# Patient Record
Sex: MALE | Race: WHITE | Employment: FULL TIME | URBAN - METROPOLITAN AREA
[De-identification: names, ages, dates, MRNs, and addresses within clinical notes are randomized per-mention and may not be internally consistent; named-entity substitution may affect disease eponyms.]

---

## 2018-10-16 ENCOUNTER — HOSPITAL ENCOUNTER (EMERGENCY)
Age: 38
Discharge: HOME | End: 2018-10-16
Attending: EMERGENCY MEDICINE
Payer: COMMERCIAL

## 2018-10-16 VITALS
HEART RATE: 64 BPM | BODY MASS INDEX: 36.53 KG/M2 | TEMPERATURE: 98.1 F | SYSTOLIC BLOOD PRESSURE: 138 MMHG | OXYGEN SATURATION: 100 % | WEIGHT: 300 LBS | DIASTOLIC BLOOD PRESSURE: 83 MMHG | HEIGHT: 76 IN | RESPIRATION RATE: 18 BRPM

## 2018-10-16 DIAGNOSIS — K08.89 PAIN, DENTAL: Primary | ICD-10-CM

## 2018-10-16 PROCEDURE — 99282 EMERGENCY DEPT VISIT SF MDM: CPT

## 2018-10-16 RX ORDER — PENICILLIN V POTASSIUM 500 MG/1
500 TABLET ORAL 4 TIMES DAILY
Qty: 28 TABLET | Refills: 0 | Status: SHIPPED | OUTPATIENT
Start: 2018-10-16 | End: 2018-10-23

## 2018-10-16 RX ORDER — IBUPROFEN 800 MG/1
800 TABLET ORAL EVERY 8 HOURS PRN
Qty: 20 TABLET | Refills: 0 | Status: SHIPPED | OUTPATIENT
Start: 2018-10-16

## 2018-10-16 ASSESSMENT — ENCOUNTER SYMPTOMS
VOMITING: 0
WHEEZING: 0
NAUSEA: 0
DIARRHEA: 0
SHORTNESS OF BREATH: 0
ABDOMINAL PAIN: 0
COUGH: 0
EYE PAIN: 0

## 2018-10-16 ASSESSMENT — PAIN DESCRIPTION - PAIN TYPE: TYPE: ACUTE PAIN

## 2018-10-16 ASSESSMENT — PAIN SCALES - GENERAL: PAINLEVEL_OUTOF10: 10

## 2018-10-16 ASSESSMENT — PAIN DESCRIPTION - LOCATION: LOCATION: TEETH

## 2018-10-16 ASSESSMENT — PAIN DESCRIPTION - DESCRIPTORS: DESCRIPTORS: SHARP

## 2018-10-16 ASSESSMENT — PAIN DESCRIPTION - FREQUENCY: FREQUENCY: CONTINUOUS

## 2018-10-16 NOTE — ED PROVIDER NOTES
no evidence of abscess on the gum line or evidence of deep space infection of the head or neck. Will treat the patient with penicillin and nonsteroidal anti-inflammatory pain medications. We provided a dental referral list.  Counseled on the importance of close follow-up. Discharged home in good condition. Clinical Impression     1.  Pain, dental        Disposition     PATIENT REFERRED TO:  [facility] Emergency Department  [address] [place] [address]  [phone]    If symptoms worsen      DISCHARGE MEDICATIONS:  Discharge Medication List as of 10/16/2018  2:45 AM      START taking these medications    Details   penicillin v potassium (VEETID) 500 MG tablet Take 1 tablet by mouth 4 times daily for 7 days, Disp-28 tablet, R-0Print             DISPOSITION Decision To Discharge 10/16/2018 02:16:13 AM       Kam Catherine MD  10/16/18 [id number]

## 2021-08-31 ENCOUNTER — HOSPITAL ENCOUNTER (EMERGENCY)
Age: 41
Discharge: HOME | End: 2021-08-31
Attending: EMERGENCY MEDICINE

## 2021-08-31 VITALS
DIASTOLIC BLOOD PRESSURE: 74 MMHG | OXYGEN SATURATION: 100 % | HEART RATE: 74 BPM | TEMPERATURE: 98.8 F | SYSTOLIC BLOOD PRESSURE: 130 MMHG | RESPIRATION RATE: 16 BRPM

## 2021-08-31 DIAGNOSIS — H00.015 HORDEOLUM EXTERNUM OF LEFT LOWER EYELID: Primary | ICD-10-CM

## 2021-08-31 PROCEDURE — 99283 EMERGENCY DEPT VISIT LOW MDM: CPT

## 2021-08-31 RX ORDER — BACITRACIN 500 [USP'U]/G
OINTMENT OPHTHALMIC 3 TIMES DAILY
Qty: 1 TUBE | Refills: 0 | Status: SHIPPED | OUTPATIENT
Start: 2021-08-31 | End: 2021-09-10

## 2021-08-31 ASSESSMENT — PAIN DESCRIPTION - LOCATION: LOCATION: EYE

## 2021-08-31 ASSESSMENT — PAIN SCALES - GENERAL
PAINLEVEL_OUTOF10: 0
PAINLEVEL_OUTOF10: 8

## 2021-08-31 ASSESSMENT — PAIN DESCRIPTION - PAIN TYPE: TYPE: ACUTE PAIN

## 2021-08-31 ASSESSMENT — PAIN DESCRIPTION - ORIENTATION: ORIENTATION: LEFT

## 2021-08-31 NOTE — ED PROVIDER NOTES
[address] [facility]          ATTENDING PHYSICIAN NOTE       Date of evaluation: 8/31/2021    Chief Complaint     Eye Problem      History of Present Illness     Tali Albarado is a 35 y.o. male who presents today with left lower eyelid swelling. Has been present for 2 weeks states he actually had on the right side as well and that went away on its own. Has been trying compresses but comes in today stating that feels like it is a little bit worse. Appears to have a hordeolum left eye but denies vision changes no eye pain no significant drainage. Review of Systems     Review of Systems  A full 10 point review of systems was obtained. Pertinent positives and negatives as documented in the HPI, otherwise all other systems were reviewed and were negative. Past Medical, Surgical, Family, and Social History     He has a past medical history of Inguinal hernia and Seizures ([facility] [address] He has a past surgical history that includes hernia repair. His family history is not on file. He reports that he has been smoking cigarettes. He has been smoking about 1.00 pack per day. His smokeless tobacco use includes chew. He reports current alcohol use. He reports that he does not use drugs. Medications     Previous Medications    IBUPROFEN (ADVIL;MOTRIN) 800 MG TABLET    Take 1 tablet by mouth every 8 hours as needed for Pain       Allergies     He has No Known Allergies. Physical Exam     INITIAL VITALS: BP: (!) 140/86, Temp: 98.8 °F (37.1 °C), Pulse: 72, Resp: 17, SpO2: 95 %   Physical Exam  General: Well appearing in NAD  HEENT:  head is atraumatic, sclera are clear, oropharynx is nonerythematous, mucus membranes are moist the left eye with hordeolum appears to be present small stye of the lower eyelid nondraining but does have a punctate area.   No conjunctival injection no consensual or direct photophobia gross visual acuity is intact extraocular movements are full and intact without limitation or pain. No external cellulitis. Neck: Trachea midline  Chest: Nonlabored respirations, clear to auscultation bilaterally  Cardiovascular: Regular rate and rhythm, 2+ radial pulses bilaterally  Abdominal: Nondistended abdomen, soft, nontender without rebound or gaurding  Skin: Warm, dry well perfused, no rashes  Extremities: no obvious deformities, no tenderness to palpation diffusely  Neurologic:  Alert and oriented, speech is clear and intact without dysarthria, gait is intact  Psychologic: appropriate mood and affect            Diagnostic Results     EKG       RADIOLOGY:  No orders to display       LABS:   No results found for this visit on 08/31/21. ED BEDSIDE ULTRASOUND:    RECENT VITALS:  BP: (!) 140/86,Temp: 98.8 °F (37.1 °C), Pulse: 72, Resp: 17, SpO2: 95 %     Procedures       ED Course     Nursing Notes, Past Medical Hx, Past Surgical Hx, Social Hx,Allergies, and Family Hx were reviewed. patient was given the following medications:  Orders Placed This Encounter   Medications    bacitracin 500 UNIT/GM ophthalmic ointment     Sig: Place into the left eye 3 times daily for 10 days     Dispense:  1 Tube     Refill:  0       CONSULTS:  None    MEDICAL DECISIONMAKING / ASSESSMENT / Azeem Rylee is a 35 y.o. male presenting with left eye hordeolum. Will dispense with bacitracin ointment and ophthalmologic follow-up. Does not appear to be cellulitic in nature. No blurry vision no eye complaints. Discharged in stable condition. Clinical Impression     1.  Hordeolum externum of left lower eyelid        Disposition     PATIENT REFERRED TO:  [address] [place] [address] [address]    Schedule an appointment as soon as possible for a visit   As needed if symptoms do not improve      DISCHARGE MEDICATIONS:  New Prescriptions    BACITRACIN 500 UNIT/GM OPHTHALMIC OINTMENT    Place into the left eye 3 times daily for 10 days       DISPOSITION Decision To Discharge 08/31/2021 06:57:53 PM       Jori Carrillo MD  08/31/21 [id number]

## 2023-10-11 ENCOUNTER — HOSPITAL ENCOUNTER (EMERGENCY)
Age: 43
Discharge: HOME | End: 2023-10-11
Attending: STUDENT IN AN ORGANIZED HEALTH CARE EDUCATION/TRAINING PROGRAM
Payer: COMMERCIAL

## 2023-10-11 VITALS
OXYGEN SATURATION: 98 % | BODY MASS INDEX: 37.87 KG/M2 | WEIGHT: 311 LBS | DIASTOLIC BLOOD PRESSURE: 75 MMHG | HEIGHT: 76 IN | HEART RATE: 90 BPM | TEMPERATURE: 98.6 F | SYSTOLIC BLOOD PRESSURE: 141 MMHG | RESPIRATION RATE: 20 BRPM

## 2023-10-11 DIAGNOSIS — J06.9 ACUTE UPPER RESPIRATORY INFECTION: Primary | ICD-10-CM

## 2023-10-11 LAB
FLUAV RNA UPPER RESP QL NAA+PROBE: NEGATIVE
FLUBV AG NPH QL: NEGATIVE
REASON FOR REJECTION: NORMAL
REJECTED TEST: NORMAL
SARS-COV-2 RDRP RESP QL NAA+PROBE: NOT DETECTED

## 2023-10-11 PROCEDURE — 6370000000 HC RX 637 (ALT 250 FOR IP): Performed by: PHYSICIAN ASSISTANT

## 2023-10-11 PROCEDURE — 96372 THER/PROPH/DIAG INJ SC/IM: CPT

## 2023-10-11 PROCEDURE — 87804 INFLUENZA ASSAY W/OPTIC: CPT

## 2023-10-11 PROCEDURE — 87635 SARS-COV-2 COVID-19 AMP PRB: CPT

## 2023-10-11 PROCEDURE — 99284 EMERGENCY DEPT VISIT MOD MDM: CPT

## 2023-10-11 PROCEDURE — 6360000002 HC RX W HCPCS: Performed by: PHYSICIAN ASSISTANT

## 2023-10-11 RX ORDER — KETOROLAC TROMETHAMINE 30 MG/ML
15 INJECTION, SOLUTION INTRAMUSCULAR; INTRAVENOUS ONCE
Status: COMPLETED | OUTPATIENT
Start: 2023-10-11 | End: 2023-10-11

## 2023-10-11 RX ORDER — OXYMETAZOLINE HYDROCHLORIDE 0.05 G/100ML
2 SPRAY NASAL ONCE
Status: COMPLETED | OUTPATIENT
Start: 2023-10-11 | End: 2023-10-11

## 2023-10-11 RX ORDER — LIDOCAINE HYDROCHLORIDE 20 MG/ML
15 SOLUTION OROPHARYNGEAL ONCE
Status: COMPLETED | OUTPATIENT
Start: 2023-10-11 | End: 2023-10-11

## 2023-10-11 RX ADMIN — OXYMETAZOLINE HCL 2 SPRAY: 0.05 SPRAY NASAL at 19:56

## 2023-10-11 RX ADMIN — KETOROLAC TROMETHAMINE 15 MG: 30 INJECTION, SOLUTION INTRAMUSCULAR; INTRAVENOUS at 19:57

## 2023-10-11 RX ADMIN — LIDOCAINE HYDROCHLORIDE 15 ML: 20 SOLUTION ORAL at 21:32

## 2023-10-11 ASSESSMENT — PAIN DESCRIPTION - DESCRIPTORS
DESCRIPTORS: NAGGING;BURNING
DESCRIPTORS: ACHING

## 2023-10-11 ASSESSMENT — LIFESTYLE VARIABLES
HOW OFTEN DO YOU HAVE A DRINK CONTAINING ALCOHOL: NEVER
HOW MANY STANDARD DRINKS CONTAINING ALCOHOL DO YOU HAVE ON A TYPICAL DAY: PATIENT DOES NOT DRINK

## 2023-10-11 ASSESSMENT — PAIN SCALES - GENERAL: PAINLEVEL_OUTOF10: 10

## 2023-10-11 ASSESSMENT — PAIN DESCRIPTION - LOCATION
LOCATION: THROAT
LOCATION: GENERALIZED;THROAT

## 2023-10-11 ASSESSMENT — PAIN - FUNCTIONAL ASSESSMENT
PAIN_FUNCTIONAL_ASSESSMENT: ACTIVITIES ARE NOT PREVENTED
PAIN_FUNCTIONAL_ASSESSMENT: 0-10

## 2023-10-12 NOTE — DISCHARGE INSTRUCTIONS
Thank you for choosing [address] for your emergency care today. We take a lot of pride in the fact that you chose us for your care and we strived to do everything necessary to provide you with excellent medical care. We hope you agree that you received excellent care today. To continue that excellent medical care, the following information very important that you read and understand before you leave the emergency department today. It contains information about:  Your diagnosis  What was done for you in the emergency department  What you can expect from your illness or injury moving forward  The steps you will need to take after leaving the emergency department to help achieve the best possible outcome for your illness or injury. What we did in the Emergency Department Today:     You were seen in the Emergency Department today by Kumar Campbell PA-C. You had the following lab abnormalities:  Labs Reviewed   RAPID INFLUENZA A/B ANTIGENS   COVID-19, RAPID   SPECIMEN REJECTION       If no result appears for the test, then it was within normal limits. If the test is pending, the hospital will nadine you if the results are abnormal as soon as the test is completed. You had the following diagnostic imaging:  No orders to display       You were given the following medications during your stay in the Emergency Department:  Orders Placed This Encounter   Medications    oxymetazoline (AFRIN) 0.05 % nasal spray 2 spray    ketorolac (TORADOL) injection 15 mg    lidocaine viscous hcl (XYLOCAINE) 2 % solution 15 mL       You were diagnosed with the followin. Acute upper respiratory infection      IMPORTANT: If your condition worsens, or your development symptoms that you find concerning and want to have checked out immediately, do not hesitate to return to the Emergency Department.  You can call - and have trained Emergency Medical Service providers bring you to the

## 2023-10-12 NOTE — ED NOTES
Pt given discharge paperwork. Pt denies questions at this time and ambulated to the lobby. Pt discharged at this time.      Yareli Kaiser RN  10/11/23 [id number]

## 2023-10-12 NOTE — ED PROVIDER NOTES
ED Attending Attestation Note     Date of evaluation: 10/11/2023    This patient was seen by the advance practice provider. I have seen and examined the patient, agree with the workup, evaluation, management and diagnosis. The care plan has been discussed. My assessment reveals well-appearing 25-year-old male presenting to the hospital for evaluation of a fever. Patient states that he has had some nasal congestion and cough for the past couple days. He states that he has been taking DayQuil and some Marychuy-[place] but has not got better. He had ill contacts couple days before he got sick. He denies any chest pain or shortness of breath no nausea no vomiting. He is smoking a pack a day at this time. Work-up done to rule out any viral causes at this time given no abnormalities in his vital signs. He is otherwise well-appearing and on examination has clear breath sounds and is alert oriented x4.      Aniyah Bobo MD  10/11/23 2018

## 2023-10-20 NOTE — ED PROVIDER NOTES
[facility] ENCOUNTER          ADVANCED PRACTICE PROVIDER NOTE       Date of evaluation: 10/11/2023    Chief Complaint (from nursing documentation)     Fever (Have been having flu like symptoms , fever ,cough,body malaise since Sunday. No relief with OTC meds. )      History of Present Illness     Edil Lucio is a 37 y.o. male who presents to the emergency department with a complaint of URI versus flulike symptoms. The patient reports that over the past 3 days, has been experiencing intermittent subjective fevers and chills, malaise, myalgias, cough, rhinorrhea and nasal congestion. He has tried occasional over-the-counter medications, including Marychuy-[place] cold and flu and DayQuil, but has not had significant improvement in his symptoms. Currently he is experiencing a headache across his frontal sinuses and his head feels congested. Reports some mild, muffled hearing. He has not measured his temperature at home. Denies any chest pain, difficult breathing, nausea, vomiting, abdominal pain, diarrhea, lightheadedness, dizziness or disequilibrium. ASSESSMENT / PLAN  ([facility])     Edil Lucio is admitted to the Emergency Department for evaluation of his chief complaint as described in the history of present illness. Complete history and physical was performed by me and my attending. Nursing notes, past medical history, surgical history, family history and social history were reviewed and addressed in the HPI. Melissa Sam is a 37 y.o. male who presents to the emergency department with a complaint of URI-like symptoms over the past 3 days. Due to the presence of the patient's viral syndrome, respiratory symptoms and/or fever, it is possible the patient has the SARS-CoV-2 infection / COVID-19  and/or Influenza infection, so a COVID-19/Influenza nasal swab was performed and results were negative.   Their presenting symptoms and associated physical exam

## 2023-10-25 ASSESSMENT — ENCOUNTER SYMPTOMS
FACIAL SWELLING: 0
RHINORRHEA: 1
NAUSEA: 0
VOMITING: 0
EYE PAIN: 0
SHORTNESS OF BREATH: 0
VOICE CHANGE: 0
COUGH: 1
SINUS PAIN: 0
CHEST TIGHTNESS: 0
SINUS PRESSURE: 1
COLOR CHANGE: 0
ABDOMINAL PAIN: 0
SORE THROAT: 1
DIARRHEA: 0
TROUBLE SWALLOWING: 0
WHEEZING: 0
ABDOMINAL DISTENTION: 0

## 2023-12-27 ENCOUNTER — APPOINTMENT (OUTPATIENT)
Dept: GENERAL RADIOLOGY | Age: 43
End: 2023-12-27
Payer: COMMERCIAL

## 2023-12-27 ENCOUNTER — HOSPITAL ENCOUNTER (EMERGENCY)
Age: 43
Discharge: HOME | End: 2023-12-27
Attending: EMERGENCY MEDICINE
Payer: COMMERCIAL

## 2023-12-27 VITALS
RESPIRATION RATE: 20 BRPM | HEART RATE: 67 BPM | WEIGHT: 312.83 LBS | SYSTOLIC BLOOD PRESSURE: 163 MMHG | TEMPERATURE: 98.3 F | OXYGEN SATURATION: 97 % | BODY MASS INDEX: 38.08 KG/M2 | DIASTOLIC BLOOD PRESSURE: 106 MMHG

## 2023-12-27 DIAGNOSIS — H00.011 HORDEOLUM EXTERNUM OF RIGHT UPPER EYELID: Primary | ICD-10-CM

## 2023-12-27 PROCEDURE — 99283 EMERGENCY DEPT VISIT LOW MDM: CPT

## 2023-12-27 PROCEDURE — 6370000000 HC RX 637 (ALT 250 FOR IP): Performed by: PHYSICIAN ASSISTANT

## 2023-12-27 PROCEDURE — 73562 X-RAY EXAM OF KNEE 3: CPT

## 2023-12-27 RX ORDER — ERYTHROMYCIN 5 MG/G
OINTMENT OPHTHALMIC 2 TIMES DAILY
Qty: 1 EACH | Refills: 0 | Status: SHIPPED | OUTPATIENT
Start: 2023-12-27

## 2023-12-27 RX ORDER — ERYTHROMYCIN 5 MG/G
OINTMENT OPHTHALMIC
Qty: 1 EACH | Refills: 0 | Status: SHIPPED | OUTPATIENT
Start: 2023-12-27 | End: 2023-12-27

## 2023-12-27 RX ADMIN — IBUPROFEN 600 MG: 200 TABLET, FILM COATED ORAL at 11:53

## 2023-12-27 NOTE — ED TRIAGE NOTES
Pt states he woke up this am with right eye pain, states he is unable to open It completely. Pt also c/o left chronic knee pain, states it feels like his knee popped out of place.

## 2023-12-27 NOTE — DISCHARGE INSTRUCTIONS
You have been given a prescription for an ointment that can be used until your eye gets better. If you would like to follow-up with her primary care doctor you can make an appointment at the [facility]. outpatient clinic. Seek medical care again if your eye gets worse and you are unable to see.

## 2023-12-27 NOTE — ED TRIAGE NOTES
[facility] Emergency Department  MEDICAL SCREENING EXAM    Date of Service: 12/27/2023    Reason for Visit: No chief complaint on file. Patient History, Brief Exam, and Initial Assessment     Abbreviated HPI: Carmelina Steinberg is a 37 y.o. male presenting with swollen eye lid. Started Sunday, put hot compress on it. Woke up today with increased swelling to upper eyelid. Does not wear glasses or contacts. Also fell 2 weeks ago and felt that he 'dislocated' his L knee. Denies any deformity but felt his knee grinding. Visible internal hordeolum on R inner eyelid. INITIAL VITALS:  ,  ,  ,  ,      Plan     Patient was evaluated in the REU for a medical screening exam.  To further evaluate the presenting complaints, the following orders have been placed:  No orders of the defined types were placed in this encounter. See primary provider's note for full details and final disposition. Current Facility-Administered Medications:   No orders of the defined types were placed in this encounter.     REU Dispo     Stable for lobby while awaiting ED bed    Relevant Medical History     Past Medical History:   Diagnosis Date    Inguinal hernia 1981    Right side    Seizures ([address]) 30 years  ago     Past Surgical History:   Procedure Laterality Date    HERNIA REPAIR       No Known Allergies

## 2023-12-27 NOTE — ED NOTES
Visual acuity   Both uncorrected 20/20  Right uncorrected 20/30  Left uncorrected 20/15     Ktia Sanderson, [address] [place] [address]  12/27/23 8221

## 2024-08-17 ENCOUNTER — HOSPITAL ENCOUNTER (EMERGENCY)
Age: 44
Discharge: HOME | End: 2024-08-17
Payer: COMMERCIAL

## 2024-08-17 ENCOUNTER — APPOINTMENT (OUTPATIENT)
Dept: GENERAL RADIOLOGY | Age: 44
End: 2024-08-17
Payer: COMMERCIAL

## 2024-08-17 VITALS
WEIGHT: 315 LBS | DIASTOLIC BLOOD PRESSURE: 97 MMHG | RESPIRATION RATE: 16 BRPM | SYSTOLIC BLOOD PRESSURE: 151 MMHG | OXYGEN SATURATION: 100 % | HEART RATE: 97 BPM | TEMPERATURE: 99 F | HEIGHT: 76 IN | BODY MASS INDEX: 38.36 KG/M2

## 2024-08-17 DIAGNOSIS — S20.212A RIB CONTUSION, LEFT, INITIAL ENCOUNTER: Primary | ICD-10-CM

## 2024-08-17 PROCEDURE — 99283 EMERGENCY DEPT VISIT LOW MDM: CPT

## 2024-08-17 PROCEDURE — 6370000000 HC RX 637 (ALT 250 FOR IP): Performed by: PHYSICIAN ASSISTANT

## 2024-08-17 PROCEDURE — 71101 X-RAY EXAM UNILAT RIBS/CHEST: CPT

## 2024-08-17 RX ORDER — HYDROCODONE BITARTRATE AND ACETAMINOPHEN 5; 325 MG/1; MG/1
1 TABLET ORAL ONCE
Status: COMPLETED | OUTPATIENT
Start: 2024-08-17 | End: 2024-08-17

## 2024-08-17 RX ORDER — NAPROXEN 500 MG/1
500 TABLET ORAL 2 TIMES DAILY PRN
Qty: 20 TABLET | Refills: 0 | Status: SHIPPED | OUTPATIENT
Start: 2024-08-17

## 2024-08-17 RX ADMIN — HYDROCODONE BITARTRATE AND ACETAMINOPHEN 1 TABLET: 5; 325 TABLET ORAL at 20:20

## 2024-08-17 ASSESSMENT — LIFESTYLE VARIABLES
HOW MANY STANDARD DRINKS CONTAINING ALCOHOL DO YOU HAVE ON A TYPICAL DAY: PATIENT DOES NOT DRINK
HOW OFTEN DO YOU HAVE A DRINK CONTAINING ALCOHOL: NEVER

## 2024-08-17 ASSESSMENT — PAIN SCALES - GENERAL
PAINLEVEL_OUTOF10: 9
PAINLEVEL_OUTOF10: 9

## 2024-08-17 ASSESSMENT — PAIN DESCRIPTION - LOCATION
LOCATION: RIB CAGE
LOCATION: RIB CAGE

## 2024-08-17 ASSESSMENT — PAIN - FUNCTIONAL ASSESSMENT: PAIN_FUNCTIONAL_ASSESSMENT: 0-10

## 2024-08-17 ASSESSMENT — PAIN DESCRIPTION - ORIENTATION: ORIENTATION: LEFT

## 2024-08-17 ASSESSMENT — PAIN DESCRIPTION - PAIN TYPE: TYPE: ACUTE PAIN

## 2024-08-17 NOTE — ED NOTES
Patient oriented to room and ED throughput process.  Safety measures with ED bed locked in lowest position and call light in reach.  Patient educated on all orders, including any medications.  Patient educated on chief complaint/symptoms. Patient encouraged to ask questions regarding care, medications or treatment plan.  Patient aware of how to reach staff with questions/concerns.   95

## 2024-08-18 NOTE — ED PROVIDER NOTES
[facility] EMERGENCY DEPARTMENT  EMERGENCY DEPARTMENT ENCOUNTER        Pt Name: Sage Vieira  MRN: [id number]  Birthdate 1980  Date of evaluation: 8/17/2024  Provider: Emigdio Gutierrez PA-C  PCP: No, Pcp  Note Started: 8:21 PM EDT 8/17/24      HÉCTOR. I have evaluated this patient.        CHIEF COMPLAINT       Chief Complaint   Patient presents with    Fall     Pt w c/o L sided rib pain after mechanical fall from lower step of stairs. No LOC. Pt denied hitting head. No blood thinners       HISTORY OF PRESENT ILLNESS: 1 or more Elements     History From: pt    Sage iVeira is a 44 y.o. male who presents complaining of left rib pain after a fall this afternoon.  Patient was stepping out of his truck when he missed the runner and fell onto the ground.  States he struck his left anterior/lateral ribs on the ground.  He has rib pain worse with breathing and movement, relieved by rest.  Denies prior rib fracture, head injury, shortness of breath, abdominal pain, other injuries.  Has been ambulatory since.    Nursing Notes were all reviewed and agreed with or any disagreements were addressed in the HPI.    REVIEW OF SYSTEMS :      Review of Systems   All other systems reviewed and are negative.      Positives and Pertinent negatives as per HPI.       PAST MEDICAL HISTORY    has a past medical history of Inguinal hernia (1981) and Seizures ([facility]) (30 years  ago).     SURGICAL HISTORY     Past Surgical History:   Procedure Laterality Date    HERNIA REPAIR         CURRENTMEDICATIONS       Discharge Medication List as of 8/17/2024  8:21 PM        CONTINUE these medications which have NOT CHANGED    Details   erythromycin (ROMYCIN) 5 MG/GM ophthalmic ointment Place into the right eye in the morning and at bedtime Apply to affected eye., Right Eye, 2 times daily Starting Wed 12/27/2023, Disp-1 each, R-0, Normal             ALLERGIES     Patient has no known allergies.    FAMILYHISTORY     History